# Patient Record
Sex: FEMALE | Race: WHITE | NOT HISPANIC OR LATINO | URBAN - METROPOLITAN AREA
[De-identification: names, ages, dates, MRNs, and addresses within clinical notes are randomized per-mention and may not be internally consistent; named-entity substitution may affect disease eponyms.]

---

## 2020-01-01 ENCOUNTER — OUTPATIENT (OUTPATIENT)
Dept: OUTPATIENT SERVICES | Facility: HOSPITAL | Age: 30
LOS: 1 days | End: 2020-01-01
Payer: MEDICAID

## 2020-01-16 DIAGNOSIS — Z71.89 OTHER SPECIFIED COUNSELING: ICD-10-CM

## 2020-06-15 PROCEDURE — G9001: CPT

## 2020-06-15 PROCEDURE — G9005: CPT

## 2020-11-10 PROBLEM — Z00.00 ENCOUNTER FOR PREVENTIVE HEALTH EXAMINATION: Status: ACTIVE | Noted: 2020-11-10

## 2020-12-11 ENCOUNTER — APPOINTMENT (OUTPATIENT)
Age: 30
End: 2020-12-11
Payer: MEDICAID

## 2020-12-11 VITALS
HEART RATE: 94 BPM | BODY MASS INDEX: 45.45 KG/M2 | SYSTOLIC BLOOD PRESSURE: 138 MMHG | DIASTOLIC BLOOD PRESSURE: 85 MMHG | TEMPERATURE: 96.5 F | WEIGHT: 293 LBS | HEIGHT: 67.5 IN | OXYGEN SATURATION: 96 %

## 2020-12-11 PROCEDURE — 99204 OFFICE O/P NEW MOD 45 MIN: CPT

## 2020-12-11 PROCEDURE — 99072 ADDL SUPL MATRL&STAF TM PHE: CPT

## 2020-12-11 NOTE — HISTORY OF PRESENT ILLNESS
[de-identified] : Pt is a 29 y/o F with pmhx of gastroparesis on trulance, occasional gerd, bipolar, anxiety, depression (cymbalta, gabapentin, wellbutrin) who presents today feeling well here for evaluation for bariatric sx. Pt states that she has struggled with her wt since childhood and has tried and failed several diet and exercise regimens. Pt states her current weight of 303 is the most she has ever weighed. States she started the process at [facility] with   and went through the process into November 2020 completing the majority of the workup. Pt was referred here given the complex medical hx and associated risk for bariatric sx. Pt reports having quit smoking cigarettes in July, denies alcohol use. Pt states she is a former addict and has been sober for 1.5 years on suboxone and currently living in a sober house. States she graduates in January and will be getting a job afterwards. Pt has not been working while living in the sober house. States she otherwise formerly lived with her 10 yr old son and parents. States her parents are supportive of her pursuing wt loss surgery. Denies n,v,c,d. Denies any previous surgeries.\par

## 2021-01-22 ENCOUNTER — APPOINTMENT (OUTPATIENT)
Dept: SURGERY | Facility: CLINIC | Age: 31
End: 2021-01-22
Payer: MEDICAID

## 2021-01-22 VITALS — BODY MASS INDEX: 45.45 KG/M2 | HEIGHT: 67.5 IN | WEIGHT: 293 LBS

## 2021-01-22 PROCEDURE — 99212 OFFICE O/P EST SF 10 MIN: CPT | Mod: 95

## 2021-01-25 NOTE — PLAN
[FreeTextEntry1] : Complete psych and nutrition\par Review workup with  and discuss next steps for surgery

## 2021-01-25 NOTE — HISTORY OF PRESENT ILLNESS
[Home] : at home, [unfilled] , at the time of the visit. [Medical Office: ([facility])___] : at the medical office located in  [de-identified] : Pt is a 31 y/o F with pmhx of gastroparesis on trulance, occasional gerd, bipolar, anxiety, depression (cymbalta, gabapentin, wellbutrin) who presents today feeling well here for evaluation for bariatric sx. Pt states that she has struggled with her wt since childhood and has tried and failed several diet and exercise regimens. Pt states her current weight of 303 is the most she has ever weighed. States she started the process at [facility] with   and went through the process into November 2020 completing the majority of the workup. Pt was referred here given the complex medical hx and associated risk for bariatric sx. Pt reports having quit smoking cigarettes in July, denies alcohol use. Pt states she is a former addict and has been sober for 1.5 years on suboxone and currently living in a sober house. States she graduates in January and will be getting a job afterwards. Pt has not been working while living in the sober house. States she otherwise formerly lived with her 10 yr old son and parents. States her parents are supportive of her pursuing wt loss surgery. Denies n,v,c,d. Denies any previous surgeries.\par \par Pt has sent over her past medical records and workup. Pt to complete additional psych and nutrition evaluation and clearance. Pt to f/u in office after complete to determine next steps for surgery with .\par

## 2021-03-29 ENCOUNTER — APPOINTMENT (OUTPATIENT)
Dept: BARIATRICS | Facility: CLINIC | Age: 31
End: 2021-03-29
Payer: MEDICAID

## 2021-03-29 PROCEDURE — 97802 MEDICAL NUTRITION INDIV IN: CPT | Mod: 95

## 2021-04-02 VITALS — WEIGHT: 293 LBS | BODY MASS INDEX: 45.45 KG/M2 | HEIGHT: 67.5 IN

## 2021-05-03 VITALS — BODY MASS INDEX: 45.45 KG/M2 | HEIGHT: 67.5 IN | WEIGHT: 293 LBS

## 2021-05-21 ENCOUNTER — APPOINTMENT (OUTPATIENT)
Dept: SURGERY | Facility: CLINIC | Age: 31
End: 2021-05-21
Payer: MEDICAID

## 2021-05-21 VITALS
BODY MASS INDEX: 45.45 KG/M2 | DIASTOLIC BLOOD PRESSURE: 83 MMHG | SYSTOLIC BLOOD PRESSURE: 129 MMHG | WEIGHT: 293 LBS | HEIGHT: 67.5 IN | HEART RATE: 94 BPM | TEMPERATURE: 96.4 F | OXYGEN SATURATION: 95 %

## 2021-05-21 DIAGNOSIS — F31.9 BIPOLAR DISORDER, UNSPECIFIED: ICD-10-CM

## 2021-05-21 DIAGNOSIS — K21.9 GASTRO-ESOPHAGEAL REFLUX DISEASE W/OUT ESOPHAGITIS: ICD-10-CM

## 2021-05-21 DIAGNOSIS — F41.9 ANXIETY DISORDER, UNSPECIFIED: ICD-10-CM

## 2021-05-21 DIAGNOSIS — Z87.898 PERSONAL HISTORY OF OTHER SPECIFIED CONDITIONS: ICD-10-CM

## 2021-05-21 DIAGNOSIS — E66.01 MORBID (SEVERE) OBESITY DUE TO EXCESS CALORIES: ICD-10-CM

## 2021-05-21 DIAGNOSIS — K31.84 GASTROPARESIS: ICD-10-CM

## 2021-05-21 DIAGNOSIS — F32.9 ANXIETY DISORDER, UNSPECIFIED: ICD-10-CM

## 2021-05-21 PROCEDURE — 99072 ADDL SUPL MATRL&STAF TM PHE: CPT

## 2021-05-21 PROCEDURE — 99213 OFFICE O/P EST LOW 20 MIN: CPT

## 2021-05-21 NOTE — HISTORY OF PRESENT ILLNESS
[de-identified] : Pt is a 29 y/o F with PMHx of obesity (BMI 50.5), gastroparesis (on Trulance), occasional GERD, bipolar, anxiety, depression (Cymbalta, gabapentin, Wellbutrin), opioid addiction who presents today feeling well for bariatric follow up. She has seen nutrition and has done a psych consult with Dr. Flores. Pt brings a recent physical from a physician at [facility]. States she does not have a PCP. Pt lives in [place]. She reports that Trulance helps her have BM because she takes Suboxone, which is constipating.

## 2021-05-21 NOTE — ASSESSMENT
[FreeTextEntry1] : Pt is a 29 y/o F with PMHx of obesity (BMI 50.5), gastroparesis (on Trulance), occasional GERD, bipolar, anxiety, depression (Cymbalta, gabapentin, Wellbutrin), opioid addiction who presents today feeling well for bariatric follow up. States she has seen Dr. Flores for psych evaluation so we will obtain this. We reviewed pt's gastric emptying studies which show improvement. Therefore, I believe that patient is an okay candidate for VSG as her BMI is >50 and believe she can do it with the physician she was previously seeing, Dr. Florez. I will contact Dr. Florez to discuss this. We discussed how patient has gained 20+ lbs in the last 2 months and I informed her that she needs to lose weight before having surgery and start to make lifestyle changes. We will obtain psych eval, speak to Dr. Florez and then contact pt with next steps.\par \par

## 2021-05-21 NOTE — END OF VISIT
[FreeTextEntry3] : All medical record entries made by the Scribe were at my, Dr. Lawler's, discretion and personally dictated by me on 05/21/2021. I have reviewed the chart and agree that the record accurately reflects my personal performance of the history, physical exam, assessment and plan. I have also personally directed, reviewed and agreed to the chart.

## 2021-05-21 NOTE — ADDENDUM
[FreeTextEntry1] : This note was written by Jessica Renee on 05/21/2021 acting as scribe for Dr. Lawler

## 2021-08-31 ENCOUNTER — OUTPATIENT (OUTPATIENT)
Dept: OUTPATIENT SERVICES | Facility: HOSPITAL | Age: 31
LOS: 1 days | End: 2021-08-31

## 2021-09-04 ENCOUNTER — APPOINTMENT (OUTPATIENT)
Dept: DISASTER EMERGENCY | Facility: CLINIC | Age: 31
End: 2021-09-04

## 2021-09-04 DIAGNOSIS — Z01.818 ENCOUNTER FOR OTHER PREPROCEDURAL EXAMINATION: ICD-10-CM

## 2021-09-05 LAB — SARS-COV-2 N GENE NPH QL NAA+PROBE: NOT DETECTED

## 2021-09-07 ENCOUNTER — INPATIENT (INPATIENT)
Facility: HOSPITAL | Age: 31
LOS: 0 days | Discharge: HOME | End: 2021-09-08
Payer: MEDICAID

## 2021-09-08 PROCEDURE — 74220 X-RAY XM ESOPHAGUS 1CNTRST: CPT | Mod: 26

## 2024-08-07 ENCOUNTER — OFFICE (OUTPATIENT)
Dept: URBAN - METROPOLITAN AREA CLINIC 103 | Facility: CLINIC | Age: 34
Setting detail: OPHTHALMOLOGY
End: 2024-08-07
Payer: COMMERCIAL

## 2024-08-07 DIAGNOSIS — H40.003: ICD-10-CM

## 2024-08-07 PROCEDURE — 92004 COMPRE OPH EXAM NEW PT 1/>: CPT | Performed by: OPHTHALMOLOGY

## 2024-08-07 PROCEDURE — 92133 CPTRZD OPH DX IMG PST SGM ON: CPT | Performed by: OPHTHALMOLOGY

## 2024-08-07 PROCEDURE — 76514 ECHO EXAM OF EYE THICKNESS: CPT | Performed by: OPHTHALMOLOGY

## 2024-08-07 ASSESSMENT — CONFRONTATIONAL VISUAL FIELD TEST (CVF)
OS_FINDINGS: FULL
OD_FINDINGS: FULL